# Patient Record
(demographics unavailable — no encounter records)

---

## 2024-10-16 NOTE — DISCUSSION/SUMMARY
[FreeTextEntry1] : Resolved viral illness. Not necessarily the influenza virus. Discussed with parents she can get another flu booster in 4 weeks, but if they prefer to hold off one dose is better than nothing.  All questions answered. Caretaker understands and agrees with plan. Her follow up at 30 months for Hep A  [] : The components of the vaccine(s) to be administered today are listed in the plan of care. The disease(s) for which the vaccine(s) are intended to prevent and the risks have been discussed with the caretaker.  The risks are also included in the appropriate vaccination information statements which have been provided to the patient's caregiver.  The caregiver has given consent to vaccinate.

## 2024-10-16 NOTE — HISTORY OF PRESENT ILLNESS
[de-identified] : fever [FreeTextEntry6] : 2 weeks ago had fever but not cough. Mom thought she had the "flu" but did not seek office visit. Since then she is fine. Mom wanted to know if she truly needs a second flu shot if she "already had the flu".

## 2025-02-19 NOTE — HISTORY OF PRESENT ILLNESS
[Parents] : parents [Yes] : Patient goes to dentist yearly [whole ___ oz/d] : consumes [unfilled] oz of whole milk per day [Fruit] : fruit [Vegetables] : vegetables [Meat] : meat [Grains] : grains [Eggs] : eggs [Fish] : fish [Dairy] : dairy [___ stools per day] : [unfilled]  stools per day [Firm] : stools are firm consistency [Normal] : Normal [In bed] : In bed [Brushing teeth] : Brushing teeth [Toothpaste] : Primary Fluoride Source: Toothpaste [In nursery school] : In nursery school [Playtime (60 min/d)] : Playtime 60 min a day [< 2 hrs of screen time] : Less than 2 hrs of screen time [Water heater temperature set at <120 degrees F] : Water heater temperature set at <120 degrees F [Car seat in back seat] : Car seat in back seat [Carbon Monoxide Detectors] : Carbon monoxide detectors [Smoke Detectors] : Smoke detectors [Exposure to electronic nicotine delivery system] : No exposure to electronic nicotine delivery system [Supervised play near cars and streets] : Supervised play near cars and streets [Up to date] : Up to date [FreeTextEntry7] : 30 m old for her well visit [de-identified] : parents noticed for several months they can hear her breathing very loud during the day and night. No snoring but very loud breathing.  [NO] : No

## 2025-02-19 NOTE — DEVELOPMENTAL MILESTONES
[Normal Development] : Normal Development [None] : none [Urinates in a potty or toilet] : urinates in a potty or toilet [Plays pretend with toys or dolls] : plays pretend with toys or dolls [Pokes food with fork] : pokes food with fork [Uses pronouns correctly] : uses pronouns correctly [Explains the reason for things,] : explains the reason for things, such as needing a sweater when it's cold [Names at least one color] : names at least one color [Walks up steps, using one] : walks up steps, using one foot, then the other [Runs well without falling] : runs well without falling [Grasps crayon with thumb] : grasps crayon with thumb and fingers instead of fist [Catches a large ball] : catches a large ball [Passed] : passed [Yes] : Completed.

## 2025-02-19 NOTE — DISCUSSION/SUMMARY
[Normal Growth] : growth [Normal Development] : development [None] : No known medical problems [No Elimination Concerns] : elimination [No Feeding Concerns] : feeding [No Skin Concerns] : skin [Normal Sleep Pattern] : sleep [Family Routines] : family routines [Language Promotion and Communication] : language promotion and communication [Social Development] : social development [ Considerations] :  considerations [Safety] : safety [No Medications] : ~He/She~ is not on any medications [Parent/Guardian] : parent/guardian [] : The components of the vaccine(s) to be administered today are listed in the plan of care. The disease(s) for which the vaccine(s) are intended to prevent and the risks have been discussed with the caretaker.  The risks are also included in the appropriate vaccination information statements which have been provided to the patient's caregiver.  The caregiver has given consent to vaccinate. [FreeTextEntry1] : Continue cow's milk. Continue table foods, 3 meals with 2-3 snacks per day. Incorporate flourinated water daily in a sippy cup. Brush teeth twice a day with soft toothbrush. Recommend visit to dentist. As per seat 's guidelines, use foward-facing car seat in back seat of car. Put toddler to sleep in own bed. Help toddler to maintain consistent daily routines and sleep schedule. Toilet training discussed. Ensure home is safe. Use consistent, positive discipline. Read aloud to toddler. Limit screen time to no more than 2 hours per day. For loud breathing: use nasal steroid once a day to each nostril for a month or so. If she is not improving will refer her to ENT.  Follow up for second Hep A in a month or so.

## 2025-03-12 NOTE — DISCUSSION/SUMMARY
[FreeTextEntry1] : stable nasal obstruction during sleep.  Snoring: if the child has increased labored breathing at night, please get the nasal spray and use it once a day for at least a month.  follow up at 3 years.  [] : The components of the vaccine(s) to be administered today are listed in the plan of care. The disease(s) for which the vaccine(s) are intended to prevent and the risks have been discussed with the caretaker.  The risks are also included in the appropriate vaccination information statements which have been provided to the patient's caregiver.  The caregiver has given consent to vaccinate.

## 2025-03-12 NOTE — HISTORY OF PRESENT ILLNESS
[de-identified] : snoring [FreeTextEntry6] : parents never received the nasal spray from pharmacy since it was not covered. The child is not snoring as much as before.